# Patient Record
(demographics unavailable — no encounter records)

---

## 2024-11-12 NOTE — HISTORY OF PRESENT ILLNESS
[FreeTextEntry1] : Pt self dc'd patch 2/2 to heightened emotionality--felt the same with Junel. Not interested in BC at this time. Menses regular, no issues. Sexually active with one partner, using condoms and tracking cycle. Discussed continued use of condoms for contraception and protection against STDs. Also discussed Plan B in the event that an issue arises with the condom.

## 2024-11-12 NOTE — PHYSICAL EXAM
[Chaperone Present] : A chaperone was present in the examining room during all aspects of the physical examination [[id number]] : A chaperone was present during the pelvic exam. [Appropriately responsive] : appropriately responsive [Alert] : alert [No Acute Distress] : no acute distress [No Lymphadenopathy] : no lymphadenopathy [Oriented x3] : oriented x3